# Patient Record
Sex: FEMALE | Race: BLACK OR AFRICAN AMERICAN | Employment: OTHER | URBAN - METROPOLITAN AREA
[De-identification: names, ages, dates, MRNs, and addresses within clinical notes are randomized per-mention and may not be internally consistent; named-entity substitution may affect disease eponyms.]

---

## 2022-08-02 ENCOUNTER — OFFICE VISIT (OUTPATIENT)
Dept: NEUROLOGY | Age: 67
End: 2022-08-02
Payer: OTHER GOVERNMENT

## 2022-08-02 VITALS — HEART RATE: 66 BPM | SYSTOLIC BLOOD PRESSURE: 115 MMHG | DIASTOLIC BLOOD PRESSURE: 79 MMHG

## 2022-08-02 DIAGNOSIS — G40.209 PARTIAL SYMPTOMATIC EPILEPSY WITH COMPLEX PARTIAL SEIZURES, NOT INTRACTABLE, WITHOUT STATUS EPILEPTICUS (HCC): Primary | ICD-10-CM

## 2022-08-02 DIAGNOSIS — G35 MS (MULTIPLE SCLEROSIS) (HCC): ICD-10-CM

## 2022-08-02 PROCEDURE — 99204 OFFICE O/P NEW MOD 45 MIN: CPT | Performed by: PSYCHIATRY & NEUROLOGY

## 2022-08-02 PROCEDURE — 1123F ACP DISCUSS/DSCN MKR DOCD: CPT | Performed by: PSYCHIATRY & NEUROLOGY

## 2022-08-02 RX ORDER — DIVALPROEX SODIUM 250 MG/1
750 TABLET, DELAYED RELEASE ORAL 2 TIMES DAILY
COMMUNITY
Start: 2022-05-15 | End: 2023-05-15

## 2022-08-02 RX ORDER — LITHIUM CARBONATE 150 MG/1
300-450 CAPSULE ORAL 3 TIMES DAILY
COMMUNITY
Start: 2022-06-26

## 2022-08-02 RX ORDER — LACOSAMIDE 200 MG/1
200 TABLET ORAL 2 TIMES DAILY
COMMUNITY

## 2022-08-02 RX ORDER — BACLOFEN 10 MG/1
20 TABLET ORAL 3 TIMES DAILY
COMMUNITY

## 2022-08-02 RX ORDER — HYDROCHLOROTHIAZIDE 25 MG/1
12.5 TABLET ORAL DAILY
COMMUNITY
Start: 2022-06-27 | End: 2023-06-27

## 2022-08-02 ASSESSMENT — ENCOUNTER SYMPTOMS
EYES NEGATIVE: 1
RESPIRATORY NEGATIVE: 1
ALLERGIC/IMMUNOLOGIC NEGATIVE: 1
GASTROINTESTINAL NEGATIVE: 1

## 2022-08-02 NOTE — PROGRESS NOTES
[address], [address], [address]  Phone: [phone] Fax [phone]  Dr. Fauzia Beckett      8/2/2022  Mina Zeng     Patient is referred by the following provider for consultation regarding as below:       I reviewed the available records and notes and have examined patient with the following findings:     Chief Complaint:  Chief Complaint   Patient presents with    New Patient    Seizures          HPI: This is a right handed 78 y.o.  female who unfortunately is here completely alone. And she is here for \"seizures\". This patient is an extremely poor historian and she states about a year ago she started having seizures where she jerks and shakes. When she later describes the seizures he states that she does not jerk and she does not shake but she just kind of stares off and slumps over. She states that she was in [facility] about a month ago and was treated and then went back and was treated again for seizures. She cannot tell me who is treated her for seizures what neurologist.  She states she is on Vimpat 200 mg twice a day and Depakote 250 mg 3 tablets 3 times a day. This dose was confirmed on the bottles that she brought in. Her lithium bottle was not brought in but she apparently is on 300 mg a day. She tells me she has never been treated with any other medicines but the when I confronted her about Keppra and Dilantin she states yes she was on those in the past.  But she cannot tell me how long ago or when she cannot tell me who changed him or when they were changed. And more importantly she cannot tell me her last seizure she cannot tell me if her last seizure was after they changed to Depakote or Vimpat. We do have an EEG done on 4-, 5-, 6- that were all normal those are 3 EEGs that were read with no seizure activity just bilateral slowing.   She had an MRI of her brain done on 5/14/2022 showing bilateral parietal atrophy and mild white matter changes. But there was no change from the 4- MRI of the brain. 6- she had a cyst and EEG a CT scan that was normal.  Her she was diagnosed with multiple sclerosis 40+ years ago and she was never treated with disease modifying therapy. She has been in a electric wheelchair for 35 to 40 years because of the MS and its affected her bilateral upper as well as lower extremity. Her lower extremity is nonfunctional she cannot ambulate at all she can transfer very well. Her upper extremities extremely weak as well. She certainly is capable of to haggle toggling the electric wheelchair and cognitively she can maneuver it. IMAGING REVIEW:  I REVIEWED PERTINENT  IMAGES AND REPORTS WITH THE PATIENT PERSONALLY, DIRECTLY AND FULLY. Past Medical History:  No past medical history on file. Past Surgical History:  No past surgical history on file. Social History:  Social History     Socioeconomic History    Marital status: Unknown     Spouse name: Not on file    Number of children: Not on file    Years of education: Not on file    Highest education level: Not on file   Occupational History    Not on file   Tobacco Use    Smoking status: Not on file    Smokeless tobacco: Not on file   Substance and Sexual Activity    Alcohol use: Not on file    Drug use: Not on file    Sexual activity: Not on file   Other Topics Concern    Not on file   Social History Narrative    Not on file     Social Determinants of Health     Financial Resource Strain: Not on file   Food Insecurity: Not on file   Transportation Needs: Not on file   Physical Activity: Not on file   Stress: Not on file   Social Connections: Not on file   Intimate Partner Violence: Not on file   Housing Stability: Not on file       Family History:   No family history on file.     Current Outpatient Medications on File Prior to Visit   Medication Sig Dispense Refill    divalproex (DEPAKOTE) 250 MG DR tablet Take 750 mg by mouth in the morning and 750 mg before bedtime. baclofen (LIORESAL) 10 MG tablet Take 20 mg by mouth in the morning and 20 mg at noon and 20 mg before bedtime. lithium 150 MG capsule Take 300-450 mg by mouth in the morning, at noon, and at bedtime      lacosamide (VIMPAT) 200 MG tablet Take 200 mg by mouth in the morning and 200 mg before bedtime. hydroCHLOROthiazide (HYDRODIURIL) 25 MG tablet Take 12.5 mg by mouth in the morning. No current facility-administered medications on file prior to visit. Allergies   Allergen Reactions    Bee Venom     Diazepam     Gadolinium Derivatives     Pentazocine     Tetracycline        Review of Systems:  Review of Systems   Constitutional: Negative. HENT: Negative. Eyes: Negative. Respiratory: Negative. Cardiovascular: Negative. Gastrointestinal: Negative. Endocrine: Negative. Genitourinary: Negative. Musculoskeletal: Negative. Skin: Negative. Allergic/Immunologic: Negative. Neurological:  Positive for seizures and weakness. No flowsheet data found. No flowsheet data found. Vitals:    08/02/22 0854   BP: 115/79   Pulse: 66        Physical Exam  Constitutional:       Appearance: Normal appearance. HENT:      Head: Normocephalic and atraumatic. Eyes:      Extraocular Movements: Extraocular movements intact and EOM normal.      Pupils: Pupils are equal, round, and reactive to light. Cardiovascular:      Rate and Rhythm: Normal rate and regular rhythm. Pulses: Normal pulses. Pulmonary:      Effort: Pulmonary effort is normal.   Abdominal:      General: Abdomen is flat. Neurological:      Mental Status: She is alert and oriented to person, place, and time. Deep Tendon Reflexes:      Reflex Scores:       Tricep reflexes are 1+ on the right side and 1+ on the left side. Bicep reflexes are 1+ on the right side and 1+ on the left side.        Brachioradialis reflexes are 1+ on the right side and 1+ on the left side. Patellar reflexes are 1+ on the right side and 1+ on the left side. Achilles reflexes are 1+ on the right side and 1+ on the left side. Neurologic Exam     Mental Status   Oriented to person, place, and time. Attention: decreased. Concentration: decreased. Level of consciousness: alert  Knowledge: poor. Cranial Nerves     CN II   Visual fields full to confrontation. CN III, IV, VI   Pupils are equal, round, and reactive to light. Extraocular motions are normal.     CN VII   Facial expression full, symmetric. Motor Exam Her proximal bilateral lower extremities strength be graded at 2-/5 she can barely  get them against gravity and when she raises him they fall immediately back down. Her bilateral tibialis anterior and distal muscles of bilateral legs are 0.5 her bilateral upper extremities proximally Kurt graded at 2+/5 in the bilateral distal upper extremities will be graded at 1/5. Sensory Exam   Patchy areas to pinprick and changes diffusely throughout her body     Gait, Coordination, and Reflexes     Tremor   Resting tremor: absent  Intention tremor: absent  Action tremor: absent    Reflexes   Right brachioradialis: 1+  Left brachioradialis: 1+  Right biceps: 1+  Left biceps: 1+  Right triceps: 1+  Left triceps: 1+  Right patellar: 1+  Left patellar: 1+  Right achilles: 1+  Left achilles: 1+She uses an electric wheelchair         Assessment   Assessment / Plan:    Deangelo Coto was seen today for new patient and seizures. Diagnoses and all orders for this visit:    Partial symptomatic epilepsy with complex partial seizures, not intractable, without status epilepticus ([address] is very difficult to make out her history as to what is going on in regards to her seizures but she says a year ago she started having seizures. I have been documented there are 3-4 EEGs that were all read as normal no seizure activity.   She cannot tell me who is her doctor who is treated Referral Type:   Eval and Treat     Referral Reason:   Specialty Services Required     Requested Specialty:   Neurology     Number of Visits Requested:   1          I have spent greater than 50% of visit discussing and counseling of patient  for treatment and diagnostic plan review. Total time 60 min     . Notes: Patient is to continue all medications as directed by prescribing physicians. Continuations on today's visit are made based on the patient's report of current medications.              Dr. Margarette Sarmiento  Consultation Neurology, Neurodiagnostics and Neurotherapeutics  Neuroelectrophysiology, EEG, EMG  [facility]  [address] [place] [address], [address] [place] [address]  Phone:  [phone]  Fax:   [phone]

## 2022-08-08 DIAGNOSIS — G40.209 PARTIAL SYMPTOMATIC EPILEPSY WITH COMPLEX PARTIAL SEIZURES, NOT INTRACTABLE, WITHOUT STATUS EPILEPTICUS (HCC): ICD-10-CM

## 2022-08-08 LAB — VALPROATE SERPL-MCNC: 114 UG/ML (ref 50–100)

## 2022-08-10 LAB — LACOSAMIDE SERPL-MCNC: 14.2 UG/ML (ref 5–10)

## 2022-09-26 ENCOUNTER — TELEPHONE (OUTPATIENT)
Dept: NEUROLOGY | Age: 67
End: 2022-09-26